# Patient Record
Sex: FEMALE | Race: WHITE | Employment: PART TIME | URBAN - METROPOLITAN AREA
[De-identification: names, ages, dates, MRNs, and addresses within clinical notes are randomized per-mention and may not be internally consistent; named-entity substitution may affect disease eponyms.]

---

## 2017-01-30 ENCOUNTER — HOSPITAL ENCOUNTER (OUTPATIENT)
Dept: MAMMOGRAPHY | Age: 67
Discharge: HOME | End: 2017-01-30
Attending: INTERNAL MEDICINE
Payer: MEDICARE

## 2017-01-30 DIAGNOSIS — Z12.31 ENCOUNTER FOR MAMMOGRAM TO ESTABLISH BASELINE MAMMOGRAM: ICD-10-CM

## 2017-01-30 PROCEDURE — 77067 SCR MAMMO BI INCL CAD: CPT

## 2017-02-03 ENCOUNTER — HOSPITAL ENCOUNTER (OUTPATIENT)
Dept: MAMMOGRAPHY | Facility: HOSPITAL | Age: 67
Discharge: HOME | End: 2017-02-03
Attending: INTERNAL MEDICINE
Payer: MEDICARE

## 2017-02-03 DIAGNOSIS — R92.8 ABNORMAL MAMMOGRAM OF LEFT BREAST: ICD-10-CM

## 2017-02-03 PROCEDURE — 77065 DX MAMMO INCL CAD UNI: CPT

## 2017-02-03 PROCEDURE — 77061 BREAST TOMOSYNTHESIS UNI: CPT

## 2017-02-03 PROCEDURE — 76642 ULTRASOUND BREAST LIMITED: CPT

## 2017-05-10 PROBLEM — J84.10 CALCIFIED GRANULOMA OF LUNG (HCC): Status: ACTIVE | Noted: 2017-05-10

## 2017-05-10 PROBLEM — I77.9 ARTERIAL DISEASE (HCC): Status: ACTIVE | Noted: 2017-05-10

## 2017-05-10 PROBLEM — F33.41 DEPRESSION, MAJOR, RECURRENT, IN PARTIAL REMISSION (HCC): Status: ACTIVE | Noted: 2017-05-10

## 2017-05-10 PROBLEM — K76.0 FATTY LIVER: Status: ACTIVE | Noted: 2017-05-10

## 2017-05-10 PROBLEM — I70.0 AORTIC ATHEROSCLEROSIS (HCC): Status: ACTIVE | Noted: 2017-05-10

## 2017-05-10 PROCEDURE — 88175 CYTOPATH C/V AUTO FLUID REDO: CPT | Performed by: INTERNAL MEDICINE

## 2017-06-21 PROBLEM — G47.33 OSA (OBSTRUCTIVE SLEEP APNEA): Status: ACTIVE | Noted: 2017-06-21

## 2018-08-10 PROBLEM — K76.0 FATTY LIVER: Status: RESOLVED | Noted: 2017-05-10 | Resolved: 2018-08-10

## 2018-10-29 PROCEDURE — 87088 URINE BACTERIA CULTURE: CPT | Performed by: PHYSICIAN ASSISTANT

## 2018-10-29 PROCEDURE — 87186 SC STD MICRODIL/AGAR DIL: CPT | Performed by: PHYSICIAN ASSISTANT

## 2018-10-29 PROCEDURE — 87086 URINE CULTURE/COLONY COUNT: CPT | Performed by: PHYSICIAN ASSISTANT

## 2019-03-08 ENCOUNTER — APPOINTMENT (OUTPATIENT)
Dept: LAB | Age: 69
End: 2019-03-08
Attending: UROLOGY
Payer: MEDICARE

## 2019-03-08 ENCOUNTER — HOSPITAL ENCOUNTER (OUTPATIENT)
Dept: MAMMOGRAPHY | Age: 69
Discharge: HOME | End: 2019-03-08
Attending: UROLOGY
Payer: MEDICARE

## 2019-03-08 DIAGNOSIS — N20.0 URIC ACID KIDNEY STONE: ICD-10-CM

## 2019-03-08 DIAGNOSIS — M10.9 GOUT INVOLVING TOE OF LEFT FOOT, UNSPECIFIED CAUSE, UNSPECIFIED CHRONICITY: ICD-10-CM

## 2019-03-08 DIAGNOSIS — Z12.31 VISIT FOR SCREENING MAMMOGRAM: ICD-10-CM

## 2019-03-08 LAB — URATE SERPL-MCNC: 4.3 MG/DL (ref 2.6–6)

## 2019-03-08 PROCEDURE — 77063 BREAST TOMOSYNTHESIS BI: CPT | Performed by: PHYSICIAN ASSISTANT

## 2019-03-08 PROCEDURE — 84550 ASSAY OF BLOOD/URIC ACID: CPT

## 2019-03-08 PROCEDURE — 36415 COLL VENOUS BLD VENIPUNCTURE: CPT

## 2019-03-08 PROCEDURE — 77067 SCR MAMMO BI INCL CAD: CPT | Performed by: PHYSICIAN ASSISTANT

## 2019-06-12 ENCOUNTER — HOSPITAL ENCOUNTER (OUTPATIENT)
Dept: BONE DENSITY | Age: 69
Discharge: HOME | End: 2019-06-12
Attending: INTERNAL MEDICINE
Payer: MEDICARE

## 2019-06-12 DIAGNOSIS — Z78.0 MENOPAUSE: ICD-10-CM

## 2019-06-12 PROCEDURE — 77080 DXA BONE DENSITY AXIAL: CPT | Performed by: INTERNAL MEDICINE

## 2019-07-03 PROBLEM — Z47.89 AFTERCARE FOLLOWING SURGERY OF THE MUSCULOSKELETAL SYSTEM, NEC: Status: ACTIVE | Noted: 2019-07-03

## 2019-07-12 ENCOUNTER — OFFICE VISIT (OUTPATIENT)
Dept: OTHER | Facility: HOSPITAL | Age: 69
End: 2019-07-12
Attending: EMERGENCY MEDICINE

## 2019-07-12 DIAGNOSIS — Z77.21 PERSONAL HISTORY OF EXPOSURE TO POTENTIALLY HAZARDOUS BODY FLUIDS: Primary | ICD-10-CM

## 2019-07-12 LAB
HBV SURFACE AG SER-ACNC: <0.1 [IU]/L
HBV SURFACE AG SERPL QL IA: NONREACTIVE
HCV AB SERPL QL IA: NONREACTIVE
HIV1+2 AB SPEC QL IA.RAPID: NONREACTIVE

## 2019-07-12 PROCEDURE — 86803 HEPATITIS C AB TEST: CPT

## 2019-07-12 PROCEDURE — 86701 HIV-1ANTIBODY: CPT

## 2019-07-12 PROCEDURE — 87340 HEPATITIS B SURFACE AG IA: CPT

## 2019-10-09 PROBLEM — Z90.711 HISTORY OF HYSTERECTOMY, SUPRACERVICAL ABDOMINAL (SUBTOTAL): Status: ACTIVE | Noted: 2019-10-09

## 2019-10-09 PROBLEM — Z90.79 HX OF BSO (BILATERAL SALPINGO-OOPHORECTOMY): Status: ACTIVE | Noted: 2019-10-09

## 2019-10-09 PROBLEM — Z90.722 HX OF BSO (BILATERAL SALPINGO-OOPHORECTOMY): Status: ACTIVE | Noted: 2019-10-09

## 2019-10-09 PROBLEM — Z80.3 FAMILY HISTORY OF BREAST CANCER IN SISTER: Status: ACTIVE | Noted: 2019-10-09

## 2019-12-18 PROBLEM — N26.1 ATROPHY OF RIGHT KIDNEY: Status: ACTIVE | Noted: 2019-12-18

## 2019-12-18 PROBLEM — N32.81 OAB (OVERACTIVE BLADDER): Status: ACTIVE | Noted: 2019-12-18

## 2020-02-27 ENCOUNTER — HOSPITAL ENCOUNTER (OUTPATIENT)
Dept: ULTRASOUND IMAGING | Age: 70
Discharge: HOME | End: 2020-02-27
Attending: UROLOGY
Payer: MEDICARE

## 2020-02-27 DIAGNOSIS — R82.998 CRYSTALLURIA: ICD-10-CM

## 2020-02-27 DIAGNOSIS — R82.81 PYURIA: ICD-10-CM

## 2020-02-27 DIAGNOSIS — N26.1 ATROPHY, KIDNEY: ICD-10-CM

## 2020-02-27 PROCEDURE — 76775 US EXAM ABDO BACK WALL LIM: CPT | Performed by: UROLOGY

## 2020-02-28 NOTE — PROGRESS NOTES
Notify patient that [address] imaged atrophy to right kidney, small right renal stone (2 mm). Left kidney is normal.    Patient to schedule a follow-up visit with Dr. Daryl Epperson for re-evaluation.

## 2020-06-24 PROBLEM — I70.0 ATHEROSCLEROSIS OF AORTA (HCC): Status: ACTIVE | Noted: 2017-05-10

## 2020-06-24 PROBLEM — Z90.722 HX OF BSO (BILATERAL SALPINGO-OOPHORECTOMY): Status: RESOLVED | Noted: 2019-10-09 | Resolved: 2020-06-24

## 2020-06-24 PROBLEM — Z47.89 AFTERCARE FOLLOWING SURGERY OF THE MUSCULOSKELETAL SYSTEM, NEC: Status: RESOLVED | Noted: 2019-07-03 | Resolved: 2020-06-24

## 2020-06-24 PROBLEM — Z90.79 HX OF BSO (BILATERAL SALPINGO-OOPHORECTOMY): Status: RESOLVED | Noted: 2019-10-09 | Resolved: 2020-06-24

## 2021-02-09 DIAGNOSIS — Z23 NEED FOR VACCINATION: ICD-10-CM

## 2021-02-12 ENCOUNTER — IMMUNIZATION (OUTPATIENT)
Dept: LAB | Age: 71
End: 2021-02-12
Attending: HOSPITALIST
Payer: MEDICARE

## 2021-02-12 DIAGNOSIS — Z23 NEED FOR VACCINATION: Primary | ICD-10-CM

## 2021-02-12 PROCEDURE — 0001A SARSCOV2 VAC 30MCG/0.3ML IM: CPT

## 2021-03-05 ENCOUNTER — IMMUNIZATION (OUTPATIENT)
Dept: LAB | Age: 71
End: 2021-03-05
Attending: HOSPITALIST
Payer: MEDICARE

## 2021-03-05 DIAGNOSIS — Z23 NEED FOR VACCINATION: Primary | ICD-10-CM

## 2021-03-05 PROCEDURE — 0002A SARSCOV2 VAC 30MCG/0.3ML IM: CPT

## 2021-08-25 PROBLEM — I77.9 ARTERIAL DISEASE (HCC): Status: RESOLVED | Noted: 2017-05-10 | Resolved: 2021-08-25

## 2024-01-03 ENCOUNTER — OFFICE VISIT (OUTPATIENT)
Facility: LOCATION | Age: 74
End: 2024-01-03
Payer: MEDICARE

## 2024-01-03 DIAGNOSIS — J32.9 CHRONIC SINUSITIS, UNSPECIFIED LOCATION: ICD-10-CM

## 2024-01-03 DIAGNOSIS — R05.9 COUGH IN ADULT PATIENT: Primary | ICD-10-CM

## 2024-01-03 DIAGNOSIS — H90.3 SENSORINEURAL HEARING LOSS (SNHL) OF BOTH EARS: ICD-10-CM

## 2024-01-03 PROCEDURE — 99204 OFFICE O/P NEW MOD 45 MIN: CPT | Performed by: OTOLARYNGOLOGY

## 2024-01-03 PROCEDURE — 31575 DIAGNOSTIC LARYNGOSCOPY: CPT | Performed by: OTOLARYNGOLOGY

## 2024-01-03 PROCEDURE — 1160F RVW MEDS BY RX/DR IN RCRD: CPT | Performed by: OTOLARYNGOLOGY

## 2024-01-03 PROCEDURE — 1159F MED LIST DOCD IN RCRD: CPT | Performed by: OTOLARYNGOLOGY

## 2024-01-03 RX ORDER — FLUTICASONE PROPIONATE 50 MCG
2 SPRAY, SUSPENSION (ML) NASAL DAILY
Qty: 16 G | Refills: 3 | Status: SHIPPED | OUTPATIENT
Start: 2024-01-03

## 2024-01-03 RX ORDER — DOXYCYCLINE HYCLATE 100 MG
100 TABLET ORAL 2 TIMES DAILY
Qty: 30 TABLET | Refills: 0 | Status: SHIPPED | OUTPATIENT
Start: 2024-01-03

## 2024-01-03 NOTE — PROGRESS NOTES
Lily Dixon is a 73 year old female.   Chief Complaint   Patient presents with    Nose Problem     HPI:   She has a history of chronic cough.  She has had this for quite some time.  She saw pulmonary medicine and was treated with Pepcid and that seemed to help.  However her cough symptoms have improved.  She is also been healing with chronic sinus congestion and some postnasal drip and headaches.  Current Outpatient Medications   Medication Sig Dispense Refill    Doxycycline Hyclate 100 MG Oral Tab Take 1 tablet (100 mg total) by mouth 2 (two) times daily. 30 tablet 0    fluticasone propionate 50 MCG/ACT Nasal Suspension 2 sprays by Nasal route daily. 16 g 3    ALLOPURINOL 100 MG Oral Tab TAKE ONE TABLET BY MOUTH ONCE NIGHTLY 90 tablet 3    rosuvastatin 10 MG Oral Tab Take 1 tablet (10 mg total) by mouth nightly. 90 tablet 3    lurasidone 20 MG Oral Tab Take 20 mg by mouth daily with breakfast.      nystatin 984248 UNIT/GM External Cream Apply twice daily 1 Tube 1    PREVIDENT 5000 BOOSTER PLUS 1.1 % Dental Paste       amphetamine-dextroamphetamine 15 MG Oral Tab Take 1 tablet by mouth 2 (two) times daily.      PEG 3350 Oral Powd Pack Take 17 g by mouth daily.      Melatonin 10 MG Oral Tab Take 1 tablet by mouth nightly as needed.      alprazolam 0.5 MG Oral Tab Take 0.5 mg by mouth 3 (three) times daily as needed for Sleep.        LamoTRIgine 150 MG Oral Tab Take 150 mg by mouth 2 (two) times daily.      Multiple Vitamins-Minerals (MULTIVITAMIN & MINERAL OR) Take 1 Tab by mouth daily.      Psyllium (METAMUCIL OR) Take 1 tablet by mouth 2 (two) times daily.          Past Medical History:   Diagnosis Date    Anxiety state, unspecified     Asymptomatic postmenopausal status (age-related) (natural)     Cataract     Depression     Dysthymic disorder     Gout     Hearing impairment     high jugular vein causes hearing impairment    High cholesterol     History of blood transfusion     a long time ago    IBS (irritable  bowel syndrome)     Kidney disease     Kidney stone     Memory loss     Myalgia and myositis, unspecified     Obstructive sleep apnea (adult) (pediatric) PSG 17 TX -/ --    AHI 15 RDI 22 SaO2 alice 89% autoPAP 7 -14 [employer]// AHI 18    Osteoarthritis     Other psoriasis     Renal disorder     renal pappillary necrosis right kidney    Uric acid nephrolithiasis       Social History:  Social History     Socioeconomic History    Marital status:    Tobacco Use    Smoking status: Former     Packs/day: 1.00     Years: 10.00     Additional pack years: 0.00     Total pack years: 10.00     Types: Cigarettes     Quit date: 1981     Years since quittin.7    Smokeless tobacco: Never   Vaping Use    Vaping Use: Never used   Substance and Sexual Activity    Alcohol use: Not Currently     Alcohol/week: 0.0 standard drinks of alcohol     Comment: 1/ MONTH    Drug use: No    Sexual activity: Not Currently     Partners: Male   Other Topics Concern    Caffeine Concern No    Exercise No      Past Surgical History:   Procedure Laterality Date    APPENDECTOMY  1973          CATARACT      COLONOSCOPY  2006    CORRECT BUNION,OTHR METHODS      cheilectomy    HYSTERECTOMY  1993    Abdominal supracervical hyst and BSO for bleeding     OOPHORECTOMY Bilateral     OTHER SURGICAL HISTORY Right     foot surgery- toe repair with screws, buyan repair    REMV CATARACT EXTRACAP,INSERT LENS Right 2015    Procedure: RIGHT PHACOEMULSIFICATION OF CATARACT WITH INTRAOCULAR LENS IMPLANT [id number];  Surgeon: Destin Germain MD;  Location: [facility]    REMV CATARACT EXTRACAP,INSERT LENS Left 2015    Procedure: LEFT PHACOEMULSIFICATION OF CATARACT WITH INTRAOCULAR LENS IMPLANT [id number];  Surgeon: Destin Germain MD;  Location: [facility]    TONSILLECTOMY           REVIEW OF SYSTEMS:   GENERAL HEALTH: feels well otherwise  GENERAL : denies fever, chills, sweats, weight loss, weight  gain  SKIN: denies any unusual skin lesions or rashes  RESPIRATORY: denies shortness of breath with exertion  NEURO: denies headaches    EXAM:   LMP  (LMP Unknown)     System Findings Details   Constitutional  Overall appearance - Normal.   Psychiatric  Orientation - Oriented to time, place, person & situation. Appropriate mood and affect.   Head/Face  Facial features -- Normal. Skull - Normal.   Eyes  Pupils equal ,round ,react to light and accomidate   Ears, Nose, Throat, Neck  Ears retracted right tympanic membrane with aberrant jugular vein in the middle ear space.  There is also a retracted left tympanic membrane nose erythema oropharynx clear neck no masses  Flexible Laryngoscopy Procedure Note (98478)    Due to inability for adequate examination of the larynx and need for magnification to perform the examination, endoscopy was performed.  Risks and benefits were discussed with patient/family and they have given verbal consent to proceed.    Pre-operative Diagnosis:   1. Cough in adult patient    2. Chronic sinusitis, unspecified location    3. Sensorineural hearing loss (SNHL) of both ears        Post-operative Diagnosis: Same    Procedure: Diagnostic flexible fiberoptic laryngoscopy    Anesthesia: topical lidocaine    Surgeon: Philip Khan MD    EBL: 0cc    Procedure Detail & Findings: There is purulent postnasal drip bilaterally.  Epiglottis and true vocal cords are normal.    Condition: Stable    Complications: Patient tolerated the procedure well with no immediate complications.      Philip Khan MD   Neurological  Memory - Normal. Cranial nerves - Cranial nerves II through XII grossly intact.   Lymph Detail  Submental. Submandibular. Anterior cervical. Posterior cervical. Supraclavicular.       ASSESSMENT AND PLAN:   1. Cough in adult patient  There is purulent postnasal drip seen.  I believe chronic sinusitis could be playing a role in her cough.  - CT SINUS [facility] (CPT=70486);  Future    2. Chronic sinusitis, unspecified location  She will take doxycycline.  She will add saline and Flonase.  She will then undergo a CT of the sinus and see us back.  - CT SINUS [facility] (CPT=70486); Future    3. Sensorineural hearing loss (SNHL) of both ears  Recent audiogram from [place] [facility] was reviewed.  The patient shows signs of mixed hearing loss.  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  She also has a retracted left tympanic membrane.  She will continue with hearing aids.      The patient indicates understanding of these issues and agrees to the plan.    No follow-ups on file.    Philip Khan MD  1/3/2024  11:40 AM

## 2024-01-29 ENCOUNTER — HOSPITAL ENCOUNTER (OUTPATIENT)
Dept: CT IMAGING | Facility: HOSPITAL | Age: 74
Discharge: HOME | End: 2024-01-29
Attending: OTOLARYNGOLOGY
Payer: COMMERCIAL

## 2024-01-29 DIAGNOSIS — R05.9 COUGH IN ADULT PATIENT: ICD-10-CM

## 2024-01-29 DIAGNOSIS — J32.9 CHRONIC SINUSITIS, UNSPECIFIED LOCATION: ICD-10-CM

## 2024-01-29 PROCEDURE — 70486 CT MAXILLOFACIAL W/O DYE: CPT | Performed by: OTOLARYNGOLOGY

## 2024-02-02 ENCOUNTER — OFFICE VISIT (OUTPATIENT)
Facility: LOCATION | Age: 74
End: 2024-02-02
Payer: MEDICARE

## 2024-02-02 DIAGNOSIS — H90.A31 MIXED CONDUCTIVE AND SENSORINEURAL HEARING LOSS OF RIGHT EAR WITH RESTRICTED HEARING OF LEFT EAR: Primary | ICD-10-CM

## 2024-02-02 DIAGNOSIS — R05.9 COUGH IN ADULT PATIENT: Primary | ICD-10-CM

## 2024-02-02 DIAGNOSIS — H90.A22 SENSORINEURAL HEARING LOSS (SNHL) OF LEFT EAR WITH RESTRICTED HEARING OF RIGHT EAR: ICD-10-CM

## 2024-02-02 PROCEDURE — 99213 OFFICE O/P EST LOW 20 MIN: CPT | Performed by: OTOLARYNGOLOGY

## 2024-02-02 PROCEDURE — 92553 AUDIOMETRY AIR & BONE: CPT | Performed by: AUDIOLOGIST

## 2024-02-02 NOTE — PROGRESS NOTES
Lily Dixon is a 73 year old female.   Chief Complaint   Patient presents with    Hearing Check     HPI:   She has a history of chronic cough.  I placed her on doxycycline and symptoms may be improved a little.    REVIEW OF SYSTEMS:   GENERAL HEALTH: feels well otherwise  GENERAL : denies fever, chills, sweats, weight loss, weight gain  SKIN: denies any unusual skin lesions or rashes  RESPIRATORY: denies shortness of breath with exertion  NEURO: denies headaches    EXAM:   LMP  (LMP Unknown)     System Findings Details   Constitutional  Overall appearance - Normal.   Psychiatric  Orientation - Oriented to time, place, person & situation. Appropriate mood and affect.   Head/Face  Facial features -- Normal. Skull - Normal.   Eyes  Pupils equal ,round ,react to light and accomidate   Ears, Nose, Throat, Neck  Ears clear nose clear pharynx clear neck no masses   Neurological  Memory - Normal. Cranial nerves - Cranial nerves II through XII grossly intact.   Lymph Detail  Submental. Submandibular. Anterior cervical. Posterior cervical. Supraclavicular.       ASSESSMENT AND PLAN:   1. Cough in adult patient  CT of the sinus reviewed with the patient.  This shows essentially clear sinuses.  There is just some mild mucosal disease.  I do not think the sinuses are a major player in her chronic cough.  She will continue to treat her reflux.      The patient indicates understanding of these issues and agrees to the plan.    No follow-ups on file.    Philip Khan MD  2/2/2024  11:47 AM

## 2024-02-02 NOTE — PROGRESS NOTES
Lily Dixon was seen for an audiometric evaluation today. Referred back to physician.    Hearing aid selection post medical clearance.    Eden Owen MS, CCC-A